# Patient Record
Sex: FEMALE | Race: BLACK OR AFRICAN AMERICAN | HISPANIC OR LATINO | URBAN - METROPOLITAN AREA
[De-identification: names, ages, dates, MRNs, and addresses within clinical notes are randomized per-mention and may not be internally consistent; named-entity substitution may affect disease eponyms.]

---

## 2023-08-07 ENCOUNTER — HOSPITAL ENCOUNTER (OUTPATIENT)
Dept: DATA CONVERSION | Facility: HOSPITAL | Age: 23
Discharge: HOME | End: 2023-08-07
Attending: STUDENT IN AN ORGANIZED HEALTH CARE EDUCATION/TRAINING PROGRAM

## 2023-08-07 DIAGNOSIS — S71.111A LACERATION WITHOUT FOREIGN BODY, RIGHT THIGH, INITIAL ENCOUNTER: ICD-10-CM

## 2023-08-07 DIAGNOSIS — Z20.822 CONTACT WITH AND (SUSPECTED) EXPOSURE TO COVID-19: ICD-10-CM

## 2023-08-07 DIAGNOSIS — Z23 ENCOUNTER FOR IMMUNIZATION: ICD-10-CM

## 2023-08-07 DIAGNOSIS — F17.290 NICOTINE DEPENDENCE, OTHER TOBACCO PRODUCT, UNCOMPLICATED: ICD-10-CM
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
## 2023-08-07 DIAGNOSIS — S51.811A LACERATION WITHOUT FOREIGN BODY OF RIGHT FOREARM, INITIAL ENCOUNTER: ICD-10-CM

## 2023-08-07 LAB
ALBUMIN SERPL-MCNC: 4.4 GM/DL (ref 3.5–5)
ALBUMIN/GLOB SERPL: 1.2 RATIO (ref 1.5–3)
ALP BLD-CCNC: 138 U/L (ref 35–125)
ALT SERPL-CCNC: 96 U/L (ref 5–40)
AMPHETAMINES UR QL SCN>1000 NG/ML: NEGATIVE
ANION GAP SERPL CALCULATED.3IONS-SCNC: 14 MMOL/L (ref 0–19)
AST SERPL-CCNC: 39 U/L (ref 5–40)
BARBITURATES UR QL SCN>300 NG/ML: NEGATIVE
BASOPHILS # BLD AUTO: 0.03 K/UL (ref 0–0.22)
BASOPHILS NFR BLD AUTO: 0.5 % (ref 0–1)
BENZODIAZ UR QL SCN>300 NG/ML: NEGATIVE
BILIRUB SERPL-MCNC: 0.3 MG/DL (ref 0.1–1.2)
BILIRUB UR QL STRIP.AUTO: NEGATIVE
BUN SERPL-MCNC: 8 MG/DL (ref 8–25)
BUN/CREAT SERPL: 10 RATIO (ref 8–21)
BZE UR QL SCN>300 NG/ML: NEGATIVE
CALCIUM SERPL-MCNC: 9.5 MG/DL (ref 8.5–10.4)
CANNABINOIDS UR QL SCN>50 NG/ML: NORMAL
CHLORIDE SERPL-SCNC: 101 MMOL/L (ref 97–107)
CLARITY UR: CLEAR
CO2 SERPL-SCNC: 23 MMOL/L (ref 24–31)
COLOR UR: COLORLESS
CREAT SERPL-MCNC: 0.8 MG/DL (ref 0.4–1.6)
DEPRECATED RDW RBC AUTO: 39.1 FL (ref 37–54)
DIFFERENTIAL METHOD BLD: ABNORMAL
DRUG SCREEN COMMENT UR-IMP: NORMAL
EOSINOPHIL # BLD AUTO: 0.16 K/UL (ref 0–0.45)
EOSINOPHIL NFR BLD: 2.9 % (ref 0–3)
ERYTHROCYTE [DISTWIDTH] IN BLOOD BY AUTOMATED COUNT: 14 % (ref 11.7–15)
ETHANOL SERPL-MCNC: 0.06 GM/DL (ref 0–0.01)
EUA DISCLAIMER: NORMAL
FENTANYL+NORFENTANYL UR QL SCN: NEGATIVE
FLUAV RNA NPH QL NAA+PROBE: NEGATIVE
FLUBV RNA NPH QL NAA+PROBE: NEGATIVE
GFR SERPL CREATININE-BSD FRML MDRD: 107 ML/MIN/1.73 M2
GLOBULIN SER-MCNC: 3.7 G/DL (ref 1.9–3.7)
GLUCOSE SERPL-MCNC: 90 MG/DL (ref 65–99)
GLUCOSE UR STRIP.AUTO-MCNC: NEGATIVE MG/DL
HCG UR QL: NEGATIVE
HCT VFR BLD AUTO: 42 % (ref 36–44)
HGB BLD-MCNC: 13.8 GM/DL (ref 12–15)
HGB UR QL STRIP.AUTO: ABNORMAL /HPF (ref 0–3)
HGB UR QL: NEGATIVE
IMM GRANULOCYTES # BLD AUTO: 0.01 K/UL (ref 0–0.1)
KETONES UR QL STRIP.AUTO: NEGATIVE
LEUKOCYTE ESTERASE UR QL STRIP.AUTO: NEGATIVE
LYMPHOCYTES # BLD AUTO: 1.78 K/UL (ref 1.2–3.2)
LYMPHOCYTES NFR BLD MANUAL: 32 % (ref 20–40)
MCH RBC QN AUTO: 25.5 PG (ref 26–34)
MCHC RBC AUTO-ENTMCNC: 32.9 % (ref 31–37)
MCV RBC AUTO: 77.5 FL (ref 80–100)
METHADONE UR QL SCN>300 NG/ML: NEGATIVE
MICROSCOPIC (UA): ABNORMAL
MONOCYTES # BLD AUTO: 0.59 K/UL (ref 0–0.8)
MONOCYTES NFR BLD MANUAL: 10.6 % (ref 0–8)
NEUTROPHILS # BLD AUTO: 3 K/UL
NEUTROPHILS # BLD AUTO: 3 K/UL (ref 1.8–7.7)
NEUTROPHILS.IMMATURE NFR BLD: 0.2 % (ref 0–1)
NEUTS SEG NFR BLD: 53.8 % (ref 50–70)
NITRITE UR QL STRIP.AUTO: NEGATIVE
NRBC BLD-RTO: 0 /100 WBC
OPIATES UR QL SCN>300 NG/ML: NEGATIVE
OXYCODONE UR QL: NEGATIVE
PCP UR QL SCN>25 NG/ML: NEGATIVE
PH UR STRIP.AUTO: 6.5 [PH] (ref 4.6–8)
PLATELET # BLD AUTO: 277 K/UL (ref 150–450)
PMV BLD AUTO: 11.4 CU (ref 7–12.6)
POTASSIUM SERPL-SCNC: 3.2 MMOL/L (ref 3.4–5.1)
PROT SERPL-MCNC: 8.1 G/DL (ref 5.9–7.9)
PROT UR STRIP.AUTO-MCNC: NEGATIVE MG/DL
RBC # BLD AUTO: 5.42 M/UL (ref 4–4.9)
SARS-COV-2 RNA SPEC QL NAA+PROBE: NEGATIVE
SODIUM SERPL-SCNC: 138 MMOL/L (ref 133–145)
SP GR UR STRIP.AUTO: 1 (ref 1–1.03)
URINE CULTURE: ABNORMAL
UROBILINOGEN UR QL STRIP.AUTO: NORMAL MG/DL (ref 0–1)
WBC # BLD AUTO: 5.6 K/UL (ref 4.5–11)
WBC #/AREA URNS AUTO: ABNORMAL /HPF (ref 0–3)

## 2023-11-24 ENCOUNTER — ANCILLARY PROCEDURE (OUTPATIENT)
Dept: RADIOLOGY | Facility: CLINIC | Age: 23
End: 2023-11-24
Payer: COMMERCIAL

## 2023-11-24 VITALS — WEIGHT: 180 LBS | BODY MASS INDEX: 33.13 KG/M2 | HEIGHT: 62 IN

## 2023-11-24 DIAGNOSIS — E22.1 HYPERPROLACTINEMIA (MULTI): ICD-10-CM

## 2023-11-24 PROCEDURE — A9575 INJ GADOTERATE MEGLUMI 0.1ML: HCPCS

## 2023-11-24 PROCEDURE — 70553 MRI BRAIN STEM W/O & W/DYE: CPT

## 2023-11-24 PROCEDURE — 2550000001 HC RX 255 CONTRASTS

## 2023-11-24 RX ORDER — GADOTERATE MEGLUMINE 376.9 MG/ML
20 INJECTION INTRAVENOUS
Status: COMPLETED | OUTPATIENT
Start: 2023-11-24 | End: 2023-11-24

## 2023-11-24 RX ADMIN — GADOTERATE MEGLUMINE 20 ML: 376.9 INJECTION INTRAVENOUS at 14:32

## 2024-07-25 ENCOUNTER — HOSPITAL ENCOUNTER (EMERGENCY)
Facility: HOSPITAL | Age: 24
Discharge: HOME | End: 2024-07-25
Attending: EMERGENCY MEDICINE
Payer: MEDICARE

## 2024-07-25 VITALS
SYSTOLIC BLOOD PRESSURE: 153 MMHG | TEMPERATURE: 99.3 F | RESPIRATION RATE: 18 BRPM | DIASTOLIC BLOOD PRESSURE: 113 MMHG | HEART RATE: 104 BPM | BODY MASS INDEX: 30 KG/M2 | WEIGHT: 163 LBS | HEIGHT: 62 IN | OXYGEN SATURATION: 100 %

## 2024-07-25 DIAGNOSIS — Z00.8 ENCOUNTER FOR PSYCHOLOGICAL EVALUATION: Primary | ICD-10-CM

## 2024-07-25 PROCEDURE — 99283 EMERGENCY DEPT VISIT LOW MDM: CPT

## 2024-07-25 ASSESSMENT — PAIN SCALES - GENERAL
PAINLEVEL_OUTOF10: 0 - NO PAIN
PAINLEVEL_OUTOF10: 0 - NO PAIN

## 2024-07-25 ASSESSMENT — PAIN DESCRIPTION - PROGRESSION: CLINICAL_PROGRESSION: NOT CHANGED

## 2024-07-25 ASSESSMENT — PAIN - FUNCTIONAL ASSESSMENT: PAIN_FUNCTIONAL_ASSESSMENT: 0-10

## 2024-07-25 NOTE — DISCHARGE INSTRUCTIONS
Follow-up with [facility] outpatient within the next 1 to 2 days.  Return to the emergency department at anytime with any new or worsening symptoms.

## 2024-07-25 NOTE — ED PROVIDER NOTES
HPI   Chief Complaint   Patient presents with    Psychiatric Evaluation     Pt was at [facility], was found visibly upset. Police were called and asked to be brought here for psychiatric evaluation. Has history of self-harm.       Patient is a 24-year-old female presenting to the emergency department for evaluation of psychiatric evaluation.  Patient states she went to a convenience store to buy alcohol and she was guarded.  She states when she was carted they thought that she had a fake ID and would not allow her to buy the alcohol.  She states that she sat outside and was crying due to how the lady in the store was treating her.  She states the  approached her and asked her why she was crying.  The officer noticed the cuts on her arm and therefore was concerned about the patient harming herself.  Patient states she was just upset and has a history of cutting her arms however already is following up with [facility] for her mental health.  She denies any suicidal or homicidal ideations.  She states that she was just upset and the  wanted her to be evaluated.  She denies any hallucinations.  She denies chest pain, shortness of breath, fever, chills, nausea, vomiting, abdominal pain.              Patient History   Past Medical History:   Diagnosis Date    Acute sinusitis, unspecified 05/16/2013    Acute sinusitis    Encounter for routine child health examination without abnormal findings     Well child check    Personal history of other diseases of the nervous system and sense organs 05/16/2013    History of acute otitis media    Personal history of other diseases of the respiratory system 05/07/2015    History of streptococcal pharyngitis     History reviewed. No pertinent surgical history.  No family history on file.  Social History     Tobacco Use    Smoking status: Not on file    Smokeless tobacco: Not on file   Substance Use Topics    Alcohol use: Not on file    Drug use: Not on file        Physical Exam   ED Triage Vitals [07/25/24 1420]   Temperature Heart Rate Respirations BP   37.4 °C (99.4 °F) (!) 104 18 (!) 153/113      Pulse Ox Temp Source Heart Rate Source Patient Position   100 % Temporal Monitor Sitting      BP Location FiO2 (%)     Right arm --       Physical Exam  Vitals and nursing note reviewed.   Constitutional:       General: She is not in acute distress.     Appearance: Normal appearance. She is not ill-appearing or toxic-appearing.   HENT:      Head: Normocephalic and atraumatic.      Nose: Nose normal.      Mouth/Throat:      Mouth: Mucous membranes are moist.   Eyes:      Pupils: Pupils are equal, round, and reactive to light.   Cardiovascular:      Rate and Rhythm: Regular rhythm. Tachycardia present.      Pulses: Normal pulses.      Heart sounds: Normal heart sounds.   Pulmonary:      Effort: Pulmonary effort is normal.      Breath sounds: Normal breath sounds.   Abdominal:      Palpations: Abdomen is soft.      Tenderness: There is no abdominal tenderness. There is no guarding or rebound.   Musculoskeletal:         General: Normal range of motion.      Cervical back: Normal range of motion.   Skin:     General: Skin is warm and dry.      Comments: Scars noted to the patient's arms bilaterally from previous cuts as patient states she has been cutting herself since she was 11 years old.  She does have some superficial abrasions noted to the left forearm where patient states that she cut herself a few times today.  Hemostasis achieved.   Neurological:      General: No focal deficit present.      Mental Status: She is alert and oriented to person, place, and time.      Sensory: No sensory deficit.      Motor: No weakness.   Psychiatric:         Mood and Affect: Mood normal.         Behavior: Behavior normal.      Comments: Patient is tearful on exam likely causing her tachycardia.           ED Course & MDM   Diagnoses as of 07/25/24 1623   Encounter for psychological evaluation  "                      [place] Coma Scale Score: 15                        Medical Decision Making  **Disclaimer parts of this chart have been completed using voice recognition software. Please excuse any errors of transcription.     Patient seen in conjunction with attending physician .     HPI: Detailed above.    Exam: A medically appropriate exam performed, outlined above, given the known history and presentation.    History obtained from: Patient    EMERGENCY DEPARTMENT COURSE and DIFFERENTIAL DIAGNOSIS/MDM:  Patient is a 24-year-old female presenting to the emergency department for psychiatric evaluation.  On physical exam vital signs remarkable for hypertension and mild tachycardia but otherwise stable patient is in no acute distress.  Patient is tearful on exam likely explaining her mild tachycardia.  She has scars noted to the bilateral upper extremities, chest, and stomach from previously cutting herself.  She has superficial cuts noted to the left forearm that appear to be new.  Patient does admit to cutting herself today however denies any suicidal or homicidal ideation.  I do not feel that the patient meets criteria for inpatient at this time.  Patient states her tetanus is up-to-date.  She has care established at [facility] right now for her mental health.  Patient was advised to follow-up with [facility].  She was discharged in stable condition.  She will return to the emergency department with any new or worsening symptoms.    Vitals:    Vitals:    07/25/24 1420 07/25/24 1425 07/25/24 1534   BP: (!) 153/113  (!) 153/113   BP Location: Right arm     Patient Position: Sitting  Sitting   Pulse: (!) 104  (!) 104   Resp: 18  18   Temp: 37.4 °C (99.4 °F)  37.4 °C (99.3 °F)   TempSrc: Temporal  Temporal   SpO2: 100%  100%   Weight:  73.9 kg (163 lb)    Height:  1.575 m (5' 2\")      History Limited by:    None    Independent history obtained from:    None    External records " reviewed:    None    Diagnostics interpreted by me:    None    Discussions with other clinicians:    None    Chronic conditions impacting care:    None    Social determinants of health affecting care:    None    Diagnostic tests considered but not performed: None    ED Medications managed:    Medications - No data to display    Prescription drugs considered:    None    Screenings:              Procedure  Procedures     Vandana Russell PA-C  07/25/24 5361